# Patient Record
Sex: FEMALE | Race: BLACK OR AFRICAN AMERICAN | URBAN - METROPOLITAN AREA
[De-identification: names, ages, dates, MRNs, and addresses within clinical notes are randomized per-mention and may not be internally consistent; named-entity substitution may affect disease eponyms.]

---

## 2022-01-07 ENCOUNTER — OFFICE VISIT (OUTPATIENT)
Dept: URBAN - METROPOLITAN AREA CLINIC 78 | Facility: CLINIC | Age: 32
End: 2022-01-07
Payer: MEDICARE

## 2022-01-07 VITALS
WEIGHT: 208.4 LBS | BODY MASS INDEX: 30.87 KG/M2 | HEIGHT: 69 IN | DIASTOLIC BLOOD PRESSURE: 82 MMHG | TEMPERATURE: 96.8 F | HEART RATE: 89 BPM | SYSTOLIC BLOOD PRESSURE: 126 MMHG

## 2022-01-07 DIAGNOSIS — R11.2 NAUSEA & VOMITING: ICD-10-CM

## 2022-01-07 DIAGNOSIS — R14.0 BLOATING: ICD-10-CM

## 2022-01-07 DIAGNOSIS — K59.01 CONSTIPATION: ICD-10-CM

## 2022-01-07 DIAGNOSIS — K21.9 GERD: ICD-10-CM

## 2022-01-07 PROBLEM — 200936003 LUPUS: Status: ACTIVE | Noted: 2022-01-07

## 2022-01-07 PROBLEM — 14760008 CONSTIPATION: Status: ACTIVE | Noted: 2022-01-07

## 2022-01-07 PROBLEM — 235919008 GALLSTONES: Status: ACTIVE | Noted: 2022-01-07

## 2022-01-07 PROBLEM — 90688005 CHRONIC RENAL FAILURE SYNDROME: Status: ACTIVE | Noted: 2022-01-07

## 2022-01-07 PROCEDURE — 99244 OFF/OP CNSLTJ NEW/EST MOD 40: CPT | Performed by: INTERNAL MEDICINE

## 2022-01-07 PROCEDURE — 99204 OFFICE O/P NEW MOD 45 MIN: CPT | Performed by: INTERNAL MEDICINE

## 2022-01-07 RX ORDER — SCOPOLAMINE 1 MG/3D
1 PATCH TO SKIN BEHIND THE EAR AS NEEDED PATCH TRANSDERMAL
Qty: 10 | Refills: 1 | OUTPATIENT

## 2022-01-07 NOTE — HPI-TODAY'S VISIT:
The patient was referred to us by Nicky Rai MD for abdominal pain. A copy of this document will be sent to the referring provider.  She has been struggling with severe abdominal pain for about 2 months. It began somewhat mildly and has progressed since. She went to [place] [facility] where she had an US. The ED physician told her he thought she may have an ulcer.   She has had intractable nausea and vomtiing. She has lost 20lbs in the past 3 weeks.   She has had chronic constipation. She states that although she has a BM daily She has extreme bloating where it looks as though she is pregnant.   She has tried Miralax and PeptoBismol with very little relief.  She had been on Linzess 145 mcg oral capsule daily but she stopped using it as it caused diarrhea.   She suffers from lupus for which she uses Prednisone as needed. She udnerwent an oopherectomy in 02/2016 and thymectomy by sternal approach on 10/2014.  She has not been using NSAIDs, but admits to daily Tylenol use.   She had US done in Jthe summer of 2018 which showed gallstones and GB sludge. She saw Dr. Kramer (surgeon) during hospitalization who deferred GB surgery as she was pregnant then.  She follows with a neurologist for frequent headaches. She has a small aneurysm.  She has a Hematologist (Dr. Paulino and underwent lymph node biopsy). Her Rheumatologist is Dr. Alcantara.   Patient denies excessive ETOH use.  Summary of prior workup: - RUQ ultrasound on 6/17/18:  CBD 4 mm.  No dilated intrahepatic bile ducts.  No liver lesion.  Normal pancreas.  Gallstones and GB sludge with mild GB wall thickening which may be due to under distention. + Raman's sign.  EGD & Colonoscopy in 8/2013 by Dr. Svetlana Kelely: unremarkable colon to the TI except for hemorroids. LA grade B esophagitis. Gastric bx unremarkable. Unremarkable duodenal biopsies. - CT scan in Jan 2016 revealed constipation and LAP - Labs reviewed revealed Hb ~ 9.6-10

## 2022-01-10 PROBLEM — 235595009 GASTROESOPHAGEAL REFLUX DISEASE: Status: ACTIVE | Noted: 2022-01-07

## 2022-01-12 ENCOUNTER — OFFICE VISIT (OUTPATIENT)
Dept: URBAN - METROPOLITAN AREA SURGERY CENTER 15 | Facility: SURGERY CENTER | Age: 32
End: 2022-01-12
Payer: MEDICARE

## 2022-01-12 ENCOUNTER — CLAIMS CREATED FROM THE CLAIM WINDOW (OUTPATIENT)
Dept: URBAN - METROPOLITAN AREA CLINIC 4 | Facility: CLINIC | Age: 32
End: 2022-01-12
Payer: MEDICARE

## 2022-01-12 DIAGNOSIS — K21.9 GASTRO-ESOPHAGEAL REFLUX DISEASE WITHOUT ESOPHAGITIS: ICD-10-CM

## 2022-01-12 DIAGNOSIS — K21.9 ACID REFLUX: ICD-10-CM

## 2022-01-12 DIAGNOSIS — K29.60 OTHER GASTRITIS WITHOUT BLEEDING: ICD-10-CM

## 2022-01-12 DIAGNOSIS — K29.60 ADENOPAPILLOMATOSIS GASTRICA: ICD-10-CM

## 2022-01-12 DIAGNOSIS — K29.80 ACUTE DUODENITIS: ICD-10-CM

## 2022-01-12 DIAGNOSIS — K29.81 DUODENITIS WITH BLEEDING: ICD-10-CM

## 2022-01-12 PROCEDURE — 88312 SPECIAL STAINS GROUP 1: CPT | Performed by: PATHOLOGY

## 2022-01-12 PROCEDURE — 88342 IMHCHEM/IMCYTCHM 1ST ANTB: CPT | Performed by: PATHOLOGY

## 2022-01-12 PROCEDURE — 88305 TISSUE EXAM BY PATHOLOGIST: CPT | Performed by: PATHOLOGY

## 2022-01-12 PROCEDURE — G8907 PT DOC NO EVENTS ON DISCHARG: HCPCS | Performed by: INTERNAL MEDICINE

## 2022-01-12 PROCEDURE — 43239 EGD BIOPSY SINGLE/MULTIPLE: CPT | Performed by: INTERNAL MEDICINE

## 2022-01-12 PROCEDURE — 88341 IMHCHEM/IMCYTCHM EA ADD ANTB: CPT | Performed by: PATHOLOGY

## 2022-01-12 RX ORDER — SCOPOLAMINE 1 MG/3D
1 PATCH TO SKIN BEHIND THE EAR AS NEEDED PATCH TRANSDERMAL
Qty: 10 | Refills: 1 | Status: ACTIVE | COMMUNITY

## 2022-01-13 ENCOUNTER — TELEPHONE ENCOUNTER (OUTPATIENT)
Dept: URBAN - METROPOLITAN AREA CLINIC 78 | Facility: CLINIC | Age: 32
End: 2022-01-13

## 2022-01-25 PROBLEM — 4556007: Status: ACTIVE | Noted: 2022-01-25

## 2022-02-17 ENCOUNTER — OFFICE VISIT (OUTPATIENT)
Dept: URBAN - METROPOLITAN AREA CLINIC 78 | Facility: CLINIC | Age: 32
End: 2022-02-17

## 2024-04-17 ENCOUNTER — OV NP (OUTPATIENT)
Dept: URBAN - METROPOLITAN AREA CLINIC 82 | Facility: CLINIC | Age: 34
End: 2024-04-17

## 2024-04-17 RX ORDER — SCOPOLAMINE 1 MG/3D
1 PATCH TO SKIN BEHIND THE EAR AS NEEDED PATCH TRANSDERMAL
Qty: 10 | Refills: 1 | COMMUNITY

## 2024-05-03 ENCOUNTER — OFFICE VISIT (OUTPATIENT)
Dept: URBAN - METROPOLITAN AREA CLINIC 111 | Facility: CLINIC | Age: 34
End: 2024-05-03

## 2024-05-09 ENCOUNTER — CLAIMS CREATED FROM THE CLAIM WINDOW (OUTPATIENT)
Dept: URBAN - METROPOLITAN AREA MEDICAL CENTER 26 | Facility: MEDICAL CENTER | Age: 34
End: 2024-05-09
Payer: MEDICARE

## 2024-05-09 DIAGNOSIS — R10.84 ABDOMINAL CRAMPING, GENERALIZED: ICD-10-CM

## 2024-05-09 DIAGNOSIS — K59.09 CHANGE IN BOWEL MOVEMENTS INTERMITTENT CONSTIPATION. URGENCY IN THE MORNING.: ICD-10-CM

## 2024-05-09 DIAGNOSIS — D64.89 ANEMIA DUE TO OTHER CAUSE: ICD-10-CM

## 2024-05-09 PROCEDURE — 99222 1ST HOSP IP/OBS MODERATE 55: CPT | Performed by: PHYSICIAN ASSISTANT

## 2024-05-09 PROCEDURE — G8427 DOCREV CUR MEDS BY ELIG CLIN: HCPCS | Performed by: PHYSICIAN ASSISTANT

## 2024-05-09 PROCEDURE — 99204 OFFICE O/P NEW MOD 45 MIN: CPT | Performed by: PHYSICIAN ASSISTANT

## 2024-05-09 PROCEDURE — 99254 IP/OBS CNSLTJ NEW/EST MOD 60: CPT | Performed by: PHYSICIAN ASSISTANT
